# Patient Record
Sex: FEMALE | Race: WHITE
[De-identification: names, ages, dates, MRNs, and addresses within clinical notes are randomized per-mention and may not be internally consistent; named-entity substitution may affect disease eponyms.]

---

## 2019-04-12 ENCOUNTER — HOSPITAL ENCOUNTER (EMERGENCY)
Dept: HOSPITAL 72 - EMR | Age: 34
Discharge: LEFT BEFORE DISCHARGE | End: 2019-04-12
Payer: SELF-PAY

## 2019-04-12 ENCOUNTER — HOSPITAL ENCOUNTER (EMERGENCY)
Dept: HOSPITAL 72 - EMR | Age: 34
Discharge: HOME | End: 2019-04-12
Payer: SELF-PAY

## 2019-04-12 VITALS — HEIGHT: 64 IN | BODY MASS INDEX: 24.59 KG/M2 | WEIGHT: 144 LBS

## 2019-04-12 VITALS — DIASTOLIC BLOOD PRESSURE: 75 MMHG | SYSTOLIC BLOOD PRESSURE: 108 MMHG

## 2019-04-12 DIAGNOSIS — Z3A.01: ICD-10-CM

## 2019-04-12 DIAGNOSIS — Z3A.00: ICD-10-CM

## 2019-04-12 DIAGNOSIS — O20.0: Primary | ICD-10-CM

## 2019-04-12 LAB
ADD MANUAL DIFF: NO
ALBUMIN SERPL-MCNC: 3.6 G/DL (ref 3.4–5)
ALBUMIN/GLOB SERPL: 0.8 {RATIO} (ref 1–2.7)
ALP SERPL-CCNC: 64 U/L (ref 46–116)
ALT SERPL-CCNC: 31 U/L (ref 12–78)
ANION GAP SERPL CALC-SCNC: 8 MMOL/L (ref 5–15)
APPEARANCE UR: (no result)
APTT BLD: 27 SEC (ref 23–33)
APTT PPP: (no result) S
AST SERPL-CCNC: 17 U/L (ref 15–37)
BASOPHILS NFR BLD AUTO: 0.7 % (ref 0–2)
BILIRUB SERPL-MCNC: 0.5 MG/DL (ref 0.2–1)
BUN SERPL-MCNC: 15 MG/DL (ref 7–18)
CALCIUM SERPL-MCNC: 9 MG/DL (ref 8.5–10.1)
CHLORIDE SERPL-SCNC: 103 MMOL/L (ref 98–107)
CO2 SERPL-SCNC: 26 MMOL/L (ref 21–32)
CREAT SERPL-MCNC: 0.8 MG/DL (ref 0.55–1.3)
EOSINOPHIL NFR BLD AUTO: 0.5 % (ref 0–3)
ERYTHROCYTE [DISTWIDTH] IN BLOOD BY AUTOMATED COUNT: 13.2 % (ref 11.6–14.8)
GLOBULIN SER-MCNC: 4.3 G/DL
GLUCOSE UR STRIP-MCNC: NEGATIVE MG/DL
HCT VFR BLD CALC: 38.5 % (ref 37–47)
HGB BLD-MCNC: 12.5 G/DL (ref 12–16)
INR PPP: 1 (ref 0.9–1.1)
KETONES UR QL STRIP: NEGATIVE
LEUKOCYTE ESTERASE UR QL STRIP: (no result)
LYMPHOCYTES NFR BLD AUTO: 32.8 % (ref 20–45)
MCV RBC AUTO: 84 FL (ref 80–99)
MONOCYTES NFR BLD AUTO: 5.4 % (ref 1–10)
NEUTROPHILS NFR BLD AUTO: 60.6 % (ref 45–75)
NITRITE UR QL STRIP: NEGATIVE
PH UR STRIP: 6 [PH] (ref 4.5–8)
PLATELET # BLD: 267 K/UL (ref 150–450)
POTASSIUM SERPL-SCNC: 3.5 MMOL/L (ref 3.5–5.1)
PROT UR QL STRIP: NEGATIVE
RBC # BLD AUTO: 4.6 M/UL (ref 4.2–5.4)
SODIUM SERPL-SCNC: 137 MMOL/L (ref 136–145)
SP GR UR STRIP: 1.01 (ref 1–1.03)
UROBILINOGEN UR-MCNC: NORMAL MG/DL (ref 0–1)
WBC # BLD AUTO: 4.9 K/UL (ref 4.8–10.8)

## 2019-04-12 PROCEDURE — 81003 URINALYSIS AUTO W/O SCOPE: CPT

## 2019-04-12 PROCEDURE — 85025 COMPLETE CBC W/AUTO DIFF WBC: CPT

## 2019-04-12 PROCEDURE — 84702 CHORIONIC GONADOTROPIN TEST: CPT

## 2019-04-12 PROCEDURE — 99284 EMERGENCY DEPT VISIT MOD MDM: CPT

## 2019-04-12 PROCEDURE — 76801 OB US < 14 WKS SINGLE FETUS: CPT

## 2019-04-12 PROCEDURE — 85730 THROMBOPLASTIN TIME PARTIAL: CPT

## 2019-04-12 PROCEDURE — 36415 COLL VENOUS BLD VENIPUNCTURE: CPT

## 2019-04-12 PROCEDURE — 80053 COMPREHEN METABOLIC PANEL: CPT

## 2019-04-12 PROCEDURE — 85610 PROTHROMBIN TIME: CPT

## 2019-04-12 NOTE — EMERGENCY ROOM REPORT
History of Present Illness


General


Chief Complaint:  To Be Triaged


Source:  Patient





Present Illness


HPI


patient returned after being seen


Chart made in error.  See initial evaluation.


Allergies:  


Coded Allergies:  


     No Known Allergies (Unverified , 4/12/19)





Patient History


Reviewed Nursing Documentation:  PMH: Agreed; PSxH: Agreed





Medical Decision Making


Diagnostic Impression:  


 Primary Impression:  


 Threatened miscarriage


ER Course


Please refer to initial chart.  


Patient left to have lunch and returned.


Status:  improved


Disposition:  HOME, SELF-CARE


Condition:  Improved


Referrals:  


NOT CHOSEN IPA/MD,REFERRING (PCP)











Eugene Zuniga MD Apr 12, 2019 12:50

## 2019-04-12 NOTE — NUR
ED Nurse Note:

pt came back.  per pt, she went out to move a car and had a lunch.  IV removed. 
 Dr. Zuniga explained the result at the bed side and discharge instruction 
provide.

## 2019-04-12 NOTE — NUR
ED Nurse Note:

pt not found in exam room after US done. Dr. Zuniga notified.  [facility] called due 
to IV access.

## 2019-04-12 NOTE — DIAGNOSTIC IMAGING REPORT
Indication: Pelvic pain, positive pregnancy test, beta hCG 5973

 

Technique: Transabdominal and transvaginal images. Doppler interrogation of the

ovaries.

 

Comparison: none

 

Findings: Uterus measures 8.1 cm in length by 4.5 cm AP. The endometrium measures 13

mm thick. Tiny fluid collection without decidual reaction is seen in the upper

endometrium, and linear areas of fluid are seen lower in the endometrium. No

intrauterine gestational sac demonstrated. Incidental note is made of a cervical

nabothian cyst. No myometrial abnormality demonstrated. No adnexal mass demonstrated.

Normal size ovaries. These demonstrate normal flow on Doppler imaging. There is

suggestion of right paraovarian varicosities. No free cul-de-sac fluid.

 

Impression: No definite intrauterine gestational sac demonstrated. Differential

considerations include very early pregnancy, spontaneous , ectopic pregnancy.

Recommend correlation with serial beta hCGs and follow-up sonography is indicated

 

Incidental finding cervical nabothian cyst

 

Possible right paraovarian varicosities; correlate with any clinical findings

suggestive of pelvic congestion syndrome

 

Findings discussed by phone with Dr. Zuniga in the emergency room at the time of

interpretation

## 2019-04-12 NOTE — NUR
ED Nurse Note:

pt walked in to ED due to vaginal spotting since yesterday aw backache.  pt is 
5 weeks pregnant.  per pt, she does not need pad.  whenever she wipe, she 
noticed the blood.  AAO x4.  respirations even and non-labored noted.  skin 
warm to touch.  no open wound noted.

## 2019-04-17 ENCOUNTER — HOSPITAL ENCOUNTER (EMERGENCY)
Dept: HOSPITAL 72 - EMR | Age: 34
Discharge: HOME | End: 2019-04-17
Payer: SELF-PAY

## 2019-04-17 VITALS — BODY MASS INDEX: 24.24 KG/M2 | WEIGHT: 142 LBS | HEIGHT: 64 IN

## 2019-04-17 VITALS — DIASTOLIC BLOOD PRESSURE: 76 MMHG | SYSTOLIC BLOOD PRESSURE: 118 MMHG

## 2019-04-17 DIAGNOSIS — O02.1: Primary | ICD-10-CM

## 2019-04-17 LAB
ADD MANUAL DIFF: NO
ALBUMIN SERPL-MCNC: 3.3 G/DL (ref 3.4–5)
ALBUMIN/GLOB SERPL: 0.9 {RATIO} (ref 1–2.7)
ALP SERPL-CCNC: 56 U/L (ref 46–116)
ALT SERPL-CCNC: 25 U/L (ref 12–78)
ANION GAP SERPL CALC-SCNC: 8 MMOL/L (ref 5–15)
APPEARANCE UR: (no result)
APTT BLD: 26 SEC (ref 23–33)
APTT PPP: (no result) S
AST SERPL-CCNC: 15 U/L (ref 15–37)
BASOPHILS NFR BLD AUTO: 0.7 % (ref 0–2)
BILIRUB SERPL-MCNC: 0.3 MG/DL (ref 0.2–1)
BUN SERPL-MCNC: 14 MG/DL (ref 7–18)
CALCIUM SERPL-MCNC: 9.2 MG/DL (ref 8.5–10.1)
CHLORIDE SERPL-SCNC: 104 MMOL/L (ref 98–107)
CO2 SERPL-SCNC: 27 MMOL/L (ref 21–32)
CREAT SERPL-MCNC: 0.8 MG/DL (ref 0.55–1.3)
EOSINOPHIL NFR BLD AUTO: 1.1 % (ref 0–3)
ERYTHROCYTE [DISTWIDTH] IN BLOOD BY AUTOMATED COUNT: 12.9 % (ref 11.6–14.8)
GLOBULIN SER-MCNC: 3.7 G/DL
GLUCOSE UR STRIP-MCNC: NEGATIVE MG/DL
HCT VFR BLD CALC: 34.5 % (ref 37–47)
HGB BLD-MCNC: 11.5 G/DL (ref 12–16)
INR PPP: 1.1 (ref 0.9–1.1)
KETONES UR QL STRIP: NEGATIVE
LEUKOCYTE ESTERASE UR QL STRIP: (no result)
LYMPHOCYTES NFR BLD AUTO: 32.6 % (ref 20–45)
MCV RBC AUTO: 83 FL (ref 80–99)
MONOCYTES NFR BLD AUTO: 6.6 % (ref 1–10)
NEUTROPHILS NFR BLD AUTO: 59 % (ref 45–75)
NITRITE UR QL STRIP: NEGATIVE
PH UR STRIP: 5 [PH] (ref 4.5–8)
PLATELET # BLD: 251 K/UL (ref 150–450)
POTASSIUM SERPL-SCNC: 3.4 MMOL/L (ref 3.5–5.1)
PROT UR QL STRIP: (no result)
RBC # BLD AUTO: 4.15 M/UL (ref 4.2–5.4)
SODIUM SERPL-SCNC: 139 MMOL/L (ref 136–145)
SP GR UR STRIP: 1.02 (ref 1–1.03)
UROBILINOGEN UR-MCNC: NORMAL MG/DL (ref 0–1)
WBC # BLD AUTO: 6.5 K/UL (ref 4.8–10.8)

## 2019-04-17 PROCEDURE — 87086 URINE CULTURE/COLONY COUNT: CPT

## 2019-04-17 PROCEDURE — 36415 COLL VENOUS BLD VENIPUNCTURE: CPT

## 2019-04-17 PROCEDURE — 76801 OB US < 14 WKS SINGLE FETUS: CPT

## 2019-04-17 PROCEDURE — 96360 HYDRATION IV INFUSION INIT: CPT

## 2019-04-17 PROCEDURE — 85025 COMPLETE CBC W/AUTO DIFF WBC: CPT

## 2019-04-17 PROCEDURE — 83690 ASSAY OF LIPASE: CPT

## 2019-04-17 PROCEDURE — 85730 THROMBOPLASTIN TIME PARTIAL: CPT

## 2019-04-17 PROCEDURE — 80053 COMPREHEN METABOLIC PANEL: CPT

## 2019-04-17 PROCEDURE — 81003 URINALYSIS AUTO W/O SCOPE: CPT

## 2019-04-17 PROCEDURE — 99284 EMERGENCY DEPT VISIT MOD MDM: CPT

## 2019-04-17 PROCEDURE — 85610 PROTHROMBIN TIME: CPT

## 2019-04-17 PROCEDURE — 84702 CHORIONIC GONADOTROPIN TEST: CPT

## 2019-04-17 NOTE — NUR
ED Nurse Note:







pt walked in c/o vaginal bleeding, pt states she is 6 wks pregnant, she noticed 
bleeding since the morning and went through about 6-7 pads, pt reports low abd 
cramping pain and dizziness and back pain, G3 T2  L2. Pt AA&ox4, gcs=15, 
skin warm and dry, resp even and unlabored on RA, denies n/v/d at this time, 
will cont monitor.

## 2019-04-17 NOTE — DIAGNOSTIC IMAGING REPORT
Indication: Reason For Exam: PAIN

 

Technique: Transabdominal and transvaginal images. Doppler interrogation of the

ovaries.

 

Comparison: 2019

 

Findings: Uterus measures 9.1 x 5.4 x 4.4 cm.  Tiny fluid collection without decidual

reaction is again seen in the endometrium. No definite gestational sac with

identifiable yolk sac or fetal pole identified. Overall findings are similar compared

to the prior exam. The left ovary measures 3.2 x 3.5 x 1.9 cm. The left ovary

measures 3 x 1.8 cm. No adnexal mass lesion is identified. Color and Doppler flow is

documented in the bilateral ovaries. A nabothian cysts noted in the cervix.

 

IMPRESSION:

Overall no significant interval change compared to exam 5 days prior. No definitive

intrauterine gestational sac. Again, differential considerations include very early

pregnancy, failed early pregnancy/spontaneous  (with retained products) or an

unidentified ectopic pregnancy. Correlation with trended beta-HCG and follow-up

ultrasound is recommended. OB/GYN follow-up recommended.

## 2019-04-17 NOTE — EMERGENCY ROOM REPORT
History of Present Illness


General


Chief Complaint:  Pregnancy Complications


Source:  Patient





Present Illness


HPI


Patient is a 34-year-old female recently seen after vaginal bleeding during 

pregnancy.  Patient is noted to be approximately 6 weeks pregnant.  She 

reported having increased heavy amounts of bleeding.  She states that she had 

not had any repeat laboratory testing done.  She reports having worsening 

cramping and bleeding.  She denies any localized pain.  She reports having some 

tissue passed.


Allergies:  


Coded Allergies:  


     No Known Allergies (Unverified , 19)





Patient History


Last Menstrual Period:  3/9/2019


Pregnant Now:  Yes - 6 weeks


:  3


Para:  2


Reviewed Nursing Documentation:  PMH: Agreed; PSxH: Agreed





Nursing Documentation-PMH


Past Medical History:  No Stated History





Review of Systems


All Other Systems:  negative except mentioned in HPI





Physical Exam





Vital Signs








  Date Time  Temp Pulse Resp B/P (MAP) Pulse Ox O2 Delivery O2 Flow Rate FiO2


 


19 00:57 98.4 76 16 113/71 99 Room Air  








Sp02 EP Interpretation:  reviewed, normal


General Appearance:  normal inspection, well appearing, no apparent distress, 

alert, GCS 15


Head:  atraumatic


ENT:  normal ENT inspection, hearing grossly normal, normal voice


Neck:  normal inspection, full range of motion, supple, no bony tend


Respiratory:  normal inspection, lungs clear, normal breath sounds, no 

respiratory distress, no retraction, no wheezing


Cardiovascular #1:  regular rate, rhythm, no edema


Gastrointestinal:  normal inspection, normal bowel sounds, non tender, soft, no 

guarding, no hernia


Genitourinary:  no CVA tenderness


Musculoskeletal:  normal inspection, back normal, normal range of motion


Neurologic:  normal inspection, alert, oriented x3, responsive, speech normal


Psychiatric:  normal inspection, judgement/insight normal, mood/affect normal


Skin:  normal inspection, normal color, no rash





Medical Decision Making


Diagnostic Impression:  


 Primary Impression:  


 Missed 


ER Course


Patient is a 34-year-old female presented after increased lower abdominal 

cramping.  Differential diagnosis include was not limited to ectopic pregnancy, 

threatened , missed  among others.  Because of complexity of 

patient's case laboratory testing and imaging studies were ordered.  Pelvic 

ultrasound showed no evidence of intrauterine pregnancy without any adnexal 

masses noted.  There is no free fluid.  Patient had previous ultrasound which 

showed similar findings.  Quantitative beta-hCG was noted to be previously 

greater than 5000 and currently was noted to be much lower.  Patient was 

advised to follow-up with OB/GYN for reevaluation and possible D&C.  She is 

advised to return if she began having worsening pain, fever, persistent 

bleeding or other concerns





Labs








Test


  19


01:46


 


White Blood Count


  6.5 K/UL


(4.8-10.8)


 


Red Blood Count


  4.15 M/UL


(4.20-5.40)


 


Hemoglobin


  11.5 G/DL


(12.0-16.0)


 


Hematocrit


  34.5 %


(37.0-47.0)


 


Mean Corpuscular Volume 83 FL (80-99) 


 


Mean Corpuscular Hemoglobin


  27.6 PG


(27.0-31.0)


 


Mean Corpuscular Hemoglobin


Concent 33.2 G/DL


(32.0-36.0)


 


Red Cell Distribution Width


  12.9 %


(11.6-14.8)


 


Platelet Count


  251 K/UL


(150-450)


 


Mean Platelet Volume


  7.9 FL


(6.5-10.1)


 


Neutrophils (%) (Auto)


  59.0 %


(45.0-75.0)


 


Lymphocytes (%) (Auto)


  32.6 %


(20.0-45.0)


 


Monocytes (%) (Auto)


  6.6 %


(1.0-10.0)


 


Eosinophils (%) (Auto)


  1.1 %


(0.0-3.0)


 


Basophils (%) (Auto)


  0.7 %


(0.0-2.0)


 


Prothrombin Time


  11.4 SEC


(9.30-11.50)


 


Prothromb Time International


Ratio 1.1 (0.9-1.1) 


 


 


Activated Partial


Thromboplast Time 26 SEC (23-33) 


 


 


Urine Color Pale yellow 


 


Urine Appearance


  Slightly


cloudy


 


Urine pH 5 (4.5-8.0) 


 


Urine Specific Gravity


  1.020


(1.005-1.035)


 


Urine Protein 2+ (NEGATIVE) 


 


Urine Glucose (UA)


  Negative


(NEGATIVE)


 


Urine Ketones


  Negative


(NEGATIVE)


 


Urine Blood 5+ (NEGATIVE) 


 


Urine Nitrite


  Negative


(NEGATIVE)


 


Urine Bilirubin


  Negative


(NEGATIVE)


 


Urine Urobilinogen


  Normal MG/DL


(0.0-1.0)


 


Urine Leukocyte Esterase 1+ (NEGATIVE) 


 


Urine RBC


  Tntc /HPF (0 -


2)


 


Urine WBC


  2-4 /HPF (0 -


2)


 


Urine Squamous Epithelial


Cells Moderate /LPF


(NONE/OCC)


 


Urine Bacteria


  Moderate /HPF


(NONE)


 


Sodium Level


  139 MMOL/L


(136-145)


 


Potassium Level


  3.4 MMOL/L


(3.5-5.1)


 


Chloride Level


  104 MMOL/L


()


 


Carbon Dioxide Level


  27 MMOL/L


(21-32)


 


Anion Gap


  8 mmol/L


(5-15)


 


Blood Urea Nitrogen


  14 mg/dL


(7-18)


 


Creatinine


  0.8 MG/DL


(0.55-1.30)


 


Estimat Glomerular Filtration


Rate > 60 mL/min


(>60)


 


Glucose Level


  94 MG/DL


()


 


Calcium Level


  9.2 MG/DL


(8.5-10.1)


 


Total Bilirubin


  0.3 MG/DL


(0.2-1.0)


 


Aspartate Amino Transf


(AST/SGOT) 15 U/L (15-37) 


 


 


Alanine Aminotransferase


(ALT/SGPT) 25 U/L (12-78) 


 


 


Alkaline Phosphatase


  56 U/L


()


 


Total Protein


  7.0 G/DL


(6.4-8.2)


 


Albumin


  3.3 G/DL


(3.4-5.0)


 


Globulin 3.7 g/dL 


 


Albumin/Globulin Ratio 0.9 (1.0-2.7) 


 


Lipase


  134 U/L


()


 


Human Chorionic Gonadotropin,


Quant 3073 mIU/mL


(1-6)











Last Vital Signs








  Date Time  Temp Pulse Resp B/P (MAP) Pulse Ox O2 Delivery O2 Flow Rate FiO2


 


19 00:57 98.4 76 16 113/71 99 Room Air  








Status:  improved


Disposition:  HOME, SELF-CARE


Condition:  Stable











Isacc Reed MD 2019 01:44

## 2019-04-17 NOTE — NUR
ED Nurse Note:





pt cleared to be d/c per ERMD, pt discharge and aftercare instruction provided, 
pt advised to follow up with ob/gyn or return to ed if changes in condition, pt 
education done via discussion and handout, pt verbalized understanding and 
agrees with plan, vss, ambulatory w/steady gait, left w/ all belongings.